# Patient Record
Sex: MALE | Race: WHITE | NOT HISPANIC OR LATINO | URBAN - METROPOLITAN AREA
[De-identification: names, ages, dates, MRNs, and addresses within clinical notes are randomized per-mention and may not be internally consistent; named-entity substitution may affect disease eponyms.]

---

## 2020-10-22 ENCOUNTER — HOSPITAL ENCOUNTER (EMERGENCY)
Facility: HOSPITAL | Age: 25
Discharge: HOME | End: 2020-10-23
Attending: EMERGENCY MEDICINE
Payer: OTHER GOVERNMENT

## 2020-10-22 DIAGNOSIS — Z78.9 ALCOHOL USE: ICD-10-CM

## 2020-10-22 DIAGNOSIS — R46.2 BIZARRE BEHAVIOR: Primary | ICD-10-CM

## 2020-10-22 LAB
ALBUMIN SERPL BCP-MCNC: 4.7 G/DL (ref 3.5–5.2)
ALP SERPL-CCNC: 52 U/L (ref 55–135)
ALT SERPL W/O P-5'-P-CCNC: 14 U/L (ref 10–44)
AMPHET+METHAMPHET UR QL: NEGATIVE
ANION GAP SERPL CALC-SCNC: 14 MMOL/L (ref 8–16)
APAP SERPL-MCNC: <3 UG/ML (ref 10–20)
AST SERPL-CCNC: 25 U/L (ref 10–40)
BARBITURATES UR QL SCN>200 NG/ML: NEGATIVE
BASOPHILS # BLD AUTO: 0 K/UL (ref 0–0.2)
BASOPHILS NFR BLD: 0 % (ref 0–1.9)
BENZODIAZ UR QL SCN>200 NG/ML: NEGATIVE
BILIRUB SERPL-MCNC: 1.3 MG/DL (ref 0.1–1)
BILIRUB UR QL STRIP: NEGATIVE
BUN SERPL-MCNC: 15 MG/DL (ref 6–20)
BZE UR QL SCN: NEGATIVE
CALCIUM SERPL-MCNC: 9.5 MG/DL (ref 8.7–10.5)
CANNABINOIDS UR QL SCN: NEGATIVE
CHLORIDE SERPL-SCNC: 110 MMOL/L (ref 95–110)
CK SERPL-CCNC: 258 U/L (ref 20–200)
CLARITY UR: CLEAR
CO2 SERPL-SCNC: 21 MMOL/L (ref 23–29)
COLOR UR: ABNORMAL
CREAT SERPL-MCNC: 1.2 MG/DL (ref 0.5–1.4)
CREAT UR-MCNC: 97.3 MG/DL (ref 23–375)
DIFFERENTIAL METHOD: ABNORMAL
EOSINOPHIL # BLD AUTO: 0 K/UL (ref 0–0.5)
EOSINOPHIL NFR BLD: 1 % (ref 0–8)
ERYTHROCYTE [DISTWIDTH] IN BLOOD BY AUTOMATED COUNT: 11.1 % (ref 11.5–14.5)
EST. GFR  (AFRICAN AMERICAN): >60 ML/MIN/1.73 M^2
EST. GFR  (NON AFRICAN AMERICAN): >60 ML/MIN/1.73 M^2
ETHANOL SERPL-MCNC: 236 MG/DL
GLUCOSE SERPL-MCNC: 94 MG/DL (ref 70–110)
GLUCOSE UR QL STRIP: NEGATIVE
HCT VFR BLD AUTO: 43.9 % (ref 40–54)
HGB BLD-MCNC: 15.4 G/DL (ref 14–18)
HGB UR QL STRIP: ABNORMAL
IMM GRANULOCYTES # BLD AUTO: 0.01 K/UL (ref 0–0.04)
IMM GRANULOCYTES NFR BLD AUTO: 0.2 % (ref 0–0.5)
KETONES UR QL STRIP: NEGATIVE
LEUKOCYTE ESTERASE UR QL STRIP: NEGATIVE
LYMPHOCYTES # BLD AUTO: 2.2 K/UL (ref 1–4.8)
LYMPHOCYTES NFR BLD: 53.1 % (ref 18–48)
MAGNESIUM SERPL-MCNC: 2.5 MG/DL (ref 1.6–2.6)
MCH RBC QN AUTO: 30.7 PG (ref 27–31)
MCHC RBC AUTO-ENTMCNC: 35.1 G/DL (ref 32–36)
MCV RBC AUTO: 88 FL (ref 82–98)
METHADONE UR QL SCN>300 NG/ML: NEGATIVE
MONOCYTES # BLD AUTO: 0.2 K/UL (ref 0.3–1)
MONOCYTES NFR BLD: 4.3 % (ref 4–15)
NEUTROPHILS # BLD AUTO: 1.7 K/UL (ref 1.8–7.7)
NEUTROPHILS NFR BLD: 41.4 % (ref 38–73)
NITRITE UR QL STRIP: NEGATIVE
NRBC BLD-RTO: 0 /100 WBC
OPIATES UR QL SCN: NEGATIVE
PCP UR QL SCN>25 NG/ML: NEGATIVE
PH UR STRIP: 5 [PH] (ref 5–8)
PLATELET # BLD AUTO: 199 K/UL (ref 150–350)
PMV BLD AUTO: 12.3 FL (ref 9.2–12.9)
POTASSIUM SERPL-SCNC: 3.4 MMOL/L (ref 3.5–5.1)
PROT SERPL-MCNC: 7.6 G/DL (ref 6–8.4)
PROT UR QL STRIP: NEGATIVE
RBC # BLD AUTO: 5.02 M/UL (ref 4.6–6.2)
SARS-COV-2 RDRP RESP QL NAA+PROBE: NEGATIVE
SODIUM SERPL-SCNC: 145 MMOL/L (ref 136–145)
SP GR UR STRIP: 1.02 (ref 1–1.03)
TOXICOLOGY INFORMATION: NORMAL
TSH SERPL DL<=0.005 MIU/L-ACNC: 0.85 UIU/ML (ref 0.4–4)
URN SPEC COLLECT METH UR: ABNORMAL
UROBILINOGEN UR STRIP-ACNC: NEGATIVE EU/DL
WBC # BLD AUTO: 4.2 K/UL (ref 3.9–12.7)

## 2020-10-22 PROCEDURE — 80320 DRUG SCREEN QUANTALCOHOLS: CPT

## 2020-10-22 PROCEDURE — 85025 COMPLETE CBC W/AUTO DIFF WBC: CPT

## 2020-10-22 PROCEDURE — 80053 COMPREHEN METABOLIC PANEL: CPT

## 2020-10-22 PROCEDURE — 83735 ASSAY OF MAGNESIUM: CPT

## 2020-10-22 PROCEDURE — 80307 DRUG TEST PRSMV CHEM ANLYZR: CPT

## 2020-10-22 PROCEDURE — 84443 ASSAY THYROID STIM HORMONE: CPT

## 2020-10-22 PROCEDURE — 82550 ASSAY OF CK (CPK): CPT

## 2020-10-22 PROCEDURE — 96372 THER/PROPH/DIAG INJ SC/IM: CPT

## 2020-10-22 PROCEDURE — 80329 ANALGESICS NON-OPIOID 1 OR 2: CPT

## 2020-10-22 PROCEDURE — 81003 URINALYSIS AUTO W/O SCOPE: CPT | Mod: 59

## 2020-10-22 PROCEDURE — 63600175 PHARM REV CODE 636 W HCPCS: Performed by: EMERGENCY MEDICINE

## 2020-10-22 PROCEDURE — 99284 EMERGENCY DEPT VISIT MOD MDM: CPT | Mod: 25

## 2020-10-22 PROCEDURE — U0002 COVID-19 LAB TEST NON-CDC: HCPCS

## 2020-10-22 RX ORDER — DIPHENHYDRAMINE HYDROCHLORIDE 50 MG/ML
50 INJECTION INTRAMUSCULAR; INTRAVENOUS
Status: COMPLETED | OUTPATIENT
Start: 2020-10-22 | End: 2020-10-22

## 2020-10-22 RX ORDER — HALOPERIDOL 5 MG/ML
5 INJECTION INTRAMUSCULAR
Status: COMPLETED | OUTPATIENT
Start: 2020-10-22 | End: 2020-10-22

## 2020-10-22 RX ADMIN — LORAZEPAM 2 MG: 2 INJECTION INTRAMUSCULAR; INTRAVENOUS at 08:10

## 2020-10-22 RX ADMIN — DIPHENHYDRAMINE HYDROCHLORIDE 50 MG: 50 INJECTION, SOLUTION INTRAMUSCULAR; INTRAVENOUS at 08:10

## 2020-10-22 RX ADMIN — HALOPERIDOL LACTATE 5 MG: 5 INJECTION, SOLUTION INTRAMUSCULAR at 08:10

## 2020-10-23 VITALS
RESPIRATION RATE: 18 BRPM | HEIGHT: 69 IN | DIASTOLIC BLOOD PRESSURE: 68 MMHG | BODY MASS INDEX: 27.4 KG/M2 | HEART RATE: 68 BPM | SYSTOLIC BLOOD PRESSURE: 110 MMHG | TEMPERATURE: 98 F | WEIGHT: 185 LBS | OXYGEN SATURATION: 100 %

## 2020-10-23 LAB — ETHANOL SERPL-MCNC: 158 MG/DL

## 2020-10-23 PROCEDURE — G0425 PR INPT TELEHEALTH CONSULT 30M: ICD-10-PCS | Mod: GT,,, | Performed by: PSYCHIATRY & NEUROLOGY

## 2020-10-23 PROCEDURE — G0425 INPT/ED TELECONSULT30: HCPCS | Mod: GT,,, | Performed by: PSYCHIATRY & NEUROLOGY

## 2020-10-23 PROCEDURE — 80320 DRUG SCREEN QUANTALCOHOLS: CPT

## 2020-10-23 NOTE — ED NOTES
Patient currently AAOX4. Reports that he does not remember anything that he is accused of doing prior to his arrival in the ER. Patient reports that his last memory is being [place] a [place].     Resp even and unlabored and lung sounds clear. Vitals stable. No c/o pain. PERRL.      Patient placed in paper scrubs and given a blanket and pillow. Patient wife remains at bedside. They were given an update that the next ETOH level will be drawn at 0700 and that his level needs to be under 100 in order for a psych evaluation.

## 2020-10-23 NOTE — ED NOTES
Patient finally awake and cooperative. States that he does not remember any of the things that has been told to him that he supposedly did prior to arrival. Patient states that the last thing he remembers is being at a mexican restaurant.

## 2020-10-23 NOTE — ED NOTES
Patient's wife Kassie called to check on him but patient stated that he did not want us talking to her about anything. Informed her of this and she was upset but told her that because of HIPPA laws and him saying no, then we can only tell her that he is stable. She stated that she is on her way here from [place]. Informed her that she would not be allowed back her to see him. She asked if she could call back in a few hours to check on him and I told her that was fine but if he still says that we cannot talk to her about his care then I will not be able to give her any information.

## 2020-10-23 NOTE — ED NOTES
Patient has been asked several times to give urine in a urinal with different staff trying to help him, but patient has not given any urine. It is needed to complete his medical clearance, so and in/out cath was performed with the help of BRAD Sommer with security standing by. Security did have to help restrain patient for cath, because he became verbally and physically aggressive. Urine was obtained and once patient was left alone, he fell back to sleep with Rt wrist and Lt ankle restraints remaining in place. Will continue to monitor.

## 2020-10-23 NOTE — ED PROVIDER NOTES
"Encounter Date: 10/22/2020    SCRIBE #1 NOTE: I, Masha Victor, am scribing for, and in the presence of,  Dr. Delgado . I have scribed the entire note.       History     Chief Complaint   Patient presents with    Psychiatric Evaluation     [employer] called EMS for pt acting bizarre in [employer] parking lot.  combative with police on EMS arrival.  on arrival to ED, pt restrained, awake, alert. stating "you don't understand."  abrasion noted to back of head.  bleeding controlled.  no LOC. wife in [place] told EMS no medical hx, no hx of psychiatric illness     25 year-old male presents to the Emergency Department via EMS for psychiatric evaluation. Per EMS, they were called to the scene after the patient began "acting bizarre" in the parking lot of a [employer]. EMS reports on arrival to the scene pt was combative with police. Pt with an abrasion to his posterior head, but denies any LOC. Patient's wife lives in [place], however reported to EMS that patient has no significant psychiatric history. On arrival to ED pt continuously repeating, "you don't understand".     The history is provided by the EMS personnel. The history is limited by the condition of the patient.     Review of patient's allergies indicates:  No Known Allergies  History reviewed. No pertinent past medical history.  History reviewed. No pertinent surgical history.  No family history on file.  Social History     Tobacco Use    Smoking status: Not on file   Substance Use Topics    Alcohol use: Not on file    Drug use: Not on file     Review of Systems   Unable to perform ROS: Psychiatric disorder       Physical Exam     Initial Vitals [10/22/20 2004]   BP Pulse Resp Temp SpO2   118/60 95 18 98.3 °F (36.8 °C) (!) 92 %      MAP       --         Physical Exam    Nursing note and vitals reviewed.  Constitutional: No distress.   HENT:   Head: Normocephalic.   Eyes: EOM are normal. Pupils are equal, round, and reactive to light.   Neck: Neck supple. "   Cardiovascular: Normal rate, regular rhythm and normal heart sounds.   Pulmonary/Chest: Breath sounds normal.   Abdominal: Soft. He exhibits no distension. There is no abdominal tenderness.   Musculoskeletal: Normal range of motion. No edema.   Neurological: He is alert.   Skin: Skin is warm and dry.   Psychiatric: His affect is angry. He is agitated, aggressive and hyperactive.         ED Course   Procedures  Labs Reviewed   CBC W/ AUTO DIFFERENTIAL - Abnormal; Notable for the following components:       Result Value    RDW 11.1 (*)     Gran # (ANC) 1.7 (*)     Mono # 0.2 (*)     Lymph% 53.1 (*)     All other components within normal limits   COMPREHENSIVE METABOLIC PANEL - Abnormal; Notable for the following components:    Potassium 3.4 (*)     CO2 21 (*)     Total Bilirubin 1.3 (*)     Alkaline Phosphatase 52 (*)     All other components within normal limits   CK - Abnormal; Notable for the following components:     (*)     All other components within normal limits   URINALYSIS - Abnormal; Notable for the following components:    Occult Blood UA Trace (*)     All other components within normal limits    Narrative:     Specimen Source->Urine   ALCOHOL,MEDICAL (ETHANOL) - Abnormal; Notable for the following components:    Alcohol, Medical, Serum 236 (*)     All other components within normal limits   ACETAMINOPHEN LEVEL - Abnormal; Notable for the following components:    Acetaminophen (Tylenol), Serum <3.0 (*)     All other components within normal limits   TSH   MAGNESIUM   SARS-COV-2 RNA AMPLIFICATION, QUAL   DRUG SCREEN PANEL, URINE EMERGENCY          Imaging Results    None          Medical Decision Making:   Clinical Tests:   Lab Tests: Ordered and Reviewed  ED Management:  25-year-old male with bizarre behavior. No known psychiatric history. Patient will be medically cleared here in the ED, then transferred to the near appropriate psychiatric facility.                             Clinical  Impression:     ICD-10-CM ICD-9-CM   1. Bizarre behavior  R46.2 312.9                          ED Disposition Condition    Transfer to [facility]         ED Prescriptions     None        Follow-up Information    None                       I, Dr. Garrett Delgado, personally performed the services described in this documentation. All medical record entries made by the scribe were at my direction and in my presence. I have reviewed the chart and agree that the record reflects my personal performance and is accurate and complete. Garrett Delgado MD.  10:49 PM 10/22/2020                 Garrett Delgado MD  10/22/20 [id number]

## 2020-10-23 NOTE — ED NOTES
"Tech attempted to get urine from patient, patient threatened to grab tech by the throat. Patient being verbally aggressive and cursing at staff. Patient yelling at this nurse stating "you don't understand" and "my aunt is in the army, she will have your head for this"   "

## 2020-10-23 NOTE — PROVIDER PROGRESS NOTES - EMERGENCY DEPT.
Encounter Date: 10/22/2020    ED Physician Progress Notes        Physician Note:   I assumed care from Dr. Zambrano at 6:00 a.m. plan is shift change was to reassess patient after tele psych consult as he apparently is acting more appropriately.    Patient was evaluated by Dr. Pompa, appreciate his recommendations.  He is deemed the patient stable for discharge if medically stable.      On exam patient is clinically sober he is answering questions appropriately and respectfully.  His girlfriend will drive home.  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    I feel he is stable for discharge at this time.  Given information to follow-up with mental health services as necessary as well as information on alcohol abuse.  Return precautions discussed.    After taking into careful account the historical factors and physical exam findings of the patient's presentation today, in conjunction with the empirical and objective data obtained on ED workup, no acute emergent medical condition has been identified. The patient appears to be low risk for an emergent medical condition and I feel it is safe and appropriate at this time for the patient to be discharged to follow-up as detailed in their discharge instructions for reevaluation and possible continued outpatient workup and management. I have discussed the specifics of the workup with the patient and the patient has verbalized understanding of the details of the workup, the diagnosis, the treatment plan, and the need for outpatient follow-up.  Although the patient has no emergent etiology today this does not preclude the development of an emergent condition so in addition, I have advised the patient that they can return to the ED and/or activate EMS at any time with worsening of their symptoms, change of their symptoms, or with any other medical complaint.  The patient remained comfortable and stable  during their visit in the ED.  Discharge and follow-up instructions discussed with the patient who expressed understanding and willingness to comply with my recommendations.

## 2020-10-23 NOTE — CONSULTS
"[facility]  Psychiatry  Telepsychiatry Consult Note    Please see previous notes:  Patient agreeable to consultation via telepsychiatry.  Tele-Consultation from Psychiatry started: 10/23/2020 at 0630  The chief complaint leading to psychiatric consultation is: Bizarre Behavior. Etoh Intoxication  This consultation was requested by ed md, the Emergency Department attending physician.  The location of the consulting psychiatrist is [address] [place] [address].  The patient location is  [facility] EMERGENCY DEPARTMENT   The patient arrived at the ED at: 10.21.20    Also present with the patient at the time of the consultation: pt's wife and ed rn  Patient Identification:   Gigi Vazquez is a 25 y.o. male.  Patient information was obtained from patient, spouse/SO and past medical records.  Patient presented involuntarily to the Emergency Department by ambulance where the patient received see Ambulance Run Sheet prior to arrival.    Consults  Subjective:     History of Present Illness: This is a 24 y/o WM that presented to the ED 2/2 "25 year-old male presents to the Emergency Department via EMS for psychiatric evaluation. Per EMS, they were called to the scene after the patient began "acting bizarre" in the parking lot of a [employer]. EMS reports on arrival to the scene pt was combative with police. Pt with an abrasion to his posterior head, but denies any LOC. Patient's wife lives in [place], however reported to EMS that patient has no significant psychiatric history. On arrival to ED pt continuously repeating, "you don't understand". The history is provided by the EMS personnel. The history is limited by the condition of the patient". On exam, the pt reports he remembers being in the parking lot of [employer], but other then that, has no memory of what happened. He was uncooperative throughout his ED course, but has slept and BAL has decreased. He is calm and cooperative on exam. [place]; " "drinks infrequently. Wife at bedside, reports she spoke with his co-worker, who reports the pt drank too much at the [place]ican restaurant. Pt has no past psychiatric hx. At this time, the pt is back to baseline and is safe for discharge.     Psychiatric Mental Status Exam:  Arousal: alert  Sensorium/Orientation: oriented to grossly intact  Behavior/Cooperation: normal, cooperative   Speech: normal tone, normal rate, normal pitch, normal volume  Language: grossly intact  Mood: " ook "   Affect: appropriate  Thought Process: normal and logical  Thought Content:   Auditory hallucinations: NO  Visual hallucinations: NO  Paranoia: NO  Delusions:  NO  Suicidal ideation: NO  Homicidal ideation: NO  Insight: limited awareness of illness  Judgment: behavior is adequate to circumstances      Past Medical History: History reviewed. No pertinent past medical history.   Laboratory Data:   Labs Reviewed   CBC W/ AUTO DIFFERENTIAL - Abnormal; Notable for the following components:       Result Value    RDW 11.1 (*)     Gran # (ANC) 1.7 (*)     Mono # 0.2 (*)     Lymph% 53.1 (*)     All other components within normal limits   COMPREHENSIVE METABOLIC PANEL - Abnormal; Notable for the following components:    Potassium 3.4 (*)     CO2 21 (*)     Total Bilirubin 1.3 (*)     Alkaline Phosphatase 52 (*)     All other components within normal limits   CK - Abnormal; Notable for the following components:     (*)     All other components within normal limits   URINALYSIS - Abnormal; Notable for the following components:    Occult Blood UA Trace (*)     All other components within normal limits    Narrative:     Specimen Source->Urine   ALCOHOL,MEDICAL (ETHANOL) - Abnormal; Notable for the following components:    Alcohol, Medical, Serum 236 (*)     All other components within normal limits   ACETAMINOPHEN LEVEL - Abnormal; Notable for the following components:    Acetaminophen (Tylenol), Serum <3.0 (*)     All other components within " normal limits   ALCOHOL,MEDICAL (ETHANOL) - Abnormal; Notable for the following components:    Alcohol, Medical, Serum 158 (*)     All other components within normal limits   DRUG SCREEN PANEL, URINE EMERGENCY    Narrative:     Specimen Source->Urine   TSH   MAGNESIUM   SARS-COV-2 RNA AMPLIFICATION, QUAL   ALCOHOL,MEDICAL (ETHANOL)     Review of patient's allergies indicates:  No Known Allergies  Medications in ER:   Medications at home:none  No new subjective & objective note has been filed under this hospital service since the last note was generated.      Assessment - Diagnosis - Goals:     Diagnosis/Impression:   - Etoh Intoxication  - SIMD    Rec:  - Safe to d/c home with wife once medically stable.   - Not to drive home unless etoh is less then legal limit     Time with patient: 30 min  More than 50% of the time was spent counseling/coordinating care  Consulting clinician was informed of the encounter and consult note.  Consultation ended: 10/23/2020 at 0700    Elder Pompa MD, MRO   Psychiatry  [facility]

## 2020-10-23 NOTE — PROVIDER PROGRESS NOTES - EMERGENCY DEPT.
Encounter Date: 10/22/2020    ED Physician Progress Notes        Physician Note:   Received sign-out from Dr. Vallejo.  Plan was to await alcohol to be under 100 and then transferred to psych facility.  Patient is much more awake, calm, cooperative, with normal insight and judgment.  Believe may have had alcohol or polysubstance abuse encephalopathy.  I will consult tele psych for reassessment and possible recent pec.  Patient will be signed out to 6:00 a.m. physician for re-evaluation a follow-up on psychiatric recommendations.

## 2020-10-23 NOTE — ED NOTES
Attempted to get vitals from patient but he would not stay still and was cursing and yelling at staff. Staff also tried to get urine from patient but be cursed at the tech and called him mary. Patient keeps sitting up in the bed and trying to get out of the restraints.

## 2020-10-23 NOTE — ED NOTES
Physician at bedside explaining to patient why he is a PEC here at the ER and what the next steps/plan about his care is. Wife remains at bedside.

## 2020-10-23 NOTE — ED NOTES
Patient remains agitated, restless, verbally aggressive and threatening to staff. Attempted to find out what happen prior to patient being brought into the ER, but patient kept going off on tangents and thinking that the ER staff was there and should know what happened. Patient remains in 4 point restraints for his safety and safety of ER staff. Patient did allow CIRO Rizo to obtain labs, but kept accusing her of saying things when had not said them. Patient was all over the place with his thoughts and talking to staff.     No c/o pain at this time. Keep asking patient for a urine specimen but states that he does not need to urinate. Resp even and unlabored. No c/o SOB. Vitals stable. No c/o nausea.

## 2020-10-23 NOTE — ED NOTES
Spoke with Jinny at the [facility] and informed her that patient PEC was resended.